# Patient Record
Sex: FEMALE | Race: WHITE | Employment: PART TIME | URBAN - METROPOLITAN AREA
[De-identification: names, ages, dates, MRNs, and addresses within clinical notes are randomized per-mention and may not be internally consistent; named-entity substitution may affect disease eponyms.]

---

## 2018-08-07 ENCOUNTER — HOSPITAL ENCOUNTER (OUTPATIENT)
Dept: GENERAL RADIOLOGY | Age: 48
Discharge: HOME | End: 2018-08-07
Payer: COMMERCIAL

## 2018-08-07 DIAGNOSIS — M25.552 PAIN OF BOTH HIP JOINTS: ICD-10-CM

## 2018-08-07 DIAGNOSIS — M25.551 PAIN OF BOTH HIP JOINTS: ICD-10-CM

## 2018-08-07 DIAGNOSIS — M54.50 ACUTE MIDLINE LOW BACK PAIN WITHOUT SCIATICA: ICD-10-CM

## 2018-08-07 PROCEDURE — 72110 X-RAY EXAM L-2 SPINE 4/>VWS: CPT

## 2018-08-07 PROCEDURE — 73502 X-RAY EXAM HIP UNI 2-3 VIEWS: CPT

## 2018-08-08 PROBLEM — E66.01 SEVERE OBESITY (BMI 35.0-39.9): Status: ACTIVE | Noted: 2018-08-08

## 2018-08-08 PROBLEM — F33.9 RECURRENT DEPRESSION (HCC): Status: ACTIVE | Noted: 2018-08-08

## 2018-08-08 NOTE — PROGRESS NOTES
Lumbar spondylosis, no acute findings. Recommend PT referral if symptoms persist  Imaging reviewed. Will discuss with patient at upcoming appt.

## 2018-08-14 ENCOUNTER — HOSPITAL ENCOUNTER (OUTPATIENT)
Dept: PHYSICAL THERAPY | Age: 48
Discharge: HOME | End: 2018-08-14
Payer: COMMERCIAL

## 2018-08-14 DIAGNOSIS — M54.50 ACUTE MIDLINE LOW BACK PAIN WITHOUT SCIATICA: ICD-10-CM

## 2018-08-14 PROCEDURE — 97140 MANUAL THERAPY 1/> REGIONS: CPT

## 2018-08-14 PROCEDURE — 97161 PT EVAL LOW COMPLEX 20 MIN: CPT

## 2018-08-14 NOTE — PROGRESS NOTES
Ambulatory/Rehab Services H2 Model Falls Risk Assessment    Risk Factor Pts. ·   Confusion/Disorientation/Impulsivity  []    4 ·   Symptomatic Depression  []   2 ·   Altered Elimination  []   1 ·   Dizziness/Vertigo  []   1 ·   Gender (Male)  []   1 ·   Any administered antiepileptics (anticonvulsants):  []   2 ·   Any administered benzodiazepines:  []   1 ·   Visual Impairment (specify):  []   1 ·   Portable Oxygen Use  []   1 ·   Orthostatic ? BP  []   1 ·   History of Recent Falls (within 3 mos.)  []   5     Ability to Rise from Chair (choose one) Pts. ·   Ability to rise in a single movement  [x]   0 ·   Pushes up, successful in one attempt  []   1 ·   Multiple attempts, but successful  []   3 ·   Unable to rise without assistance  []   4   Total: (5 or greater = High Risk) 0     Falls Prevention Plan:   []                Physical Limitations to Exercise (specify):   []                Mobility Assistance Device (type):   []                Exercise/Equipment Adaptation (specify):    ©2010 [facility] [employer] [address] Patent #[id number].  Federal Law prohibits the replication, distribution or use without written permission from [employer]

## 2018-08-14 NOTE — THERAPY EVALUATION
Inge Torres  : 1970  Primary: [facility]*  Secondary:  Therapy Center at [address], [address] [place] [address]  [phone]   [phone]       OUTPATIENT PHYSICAL THERAPY:Initial Assessment 2018   ICD-10: Treatment Diagnosis: low back pain (M54.5); Strain of muscle, fascia and tendon of right hip, sequela (S76.011S); Strain of muscle, fascia and tendon of left hip, sequela (S76.012S); Muscle weakness (generalized) (M62.81)  Precautions/Allergies:   Amphetamine; Benzodiazepines; and Opioids - morphine analogues   Fall Risk Score: 0 (? 5 = High Risk)  MD Orders: evaluate and treat  MEDICAL/REFERRING DIAGNOSIS:  Acute midline low back pain without sciatica [M54.5]   DATE OF ONSET: chronic with worsening over last month   REFERRING PHYSICIAN: Alexi Smith NP  RETURN PHYSICIAN APPOINTMENT: as needed      INITIAL ASSESSMENT:  Ms. Venita Guzman is a 41year old female who presents to PT with report of chronic bilateral hip pain and low back pain. She reports hip pain and back pain that began gradually with no mechanism or injury and has persisted. She reports pain is worst with rising in AM, standing after being seated for a while, sitting > 30 minutes, and limits her ability to exercise and sit cross-legged (albert-cross applesauce). She presents to therapy with decreased soft tissue mobility, painful endrange hip ROM, decreased strength, and decreased endurance. Pt will benefit from skilled PT to address above listed impairments and functional limitations to facilitate return to prior level of function. Due to the chronicity of symptoms, patient's plan of care is 60 days. PROBLEM LIST (Impacting functional limitations):  1. Decreased Strength  2. Decreased ADL/Functional Activities  3. Increased Pain  4. Decreased Activity Tolerance  5.  Decreased Flexibility/Joint Mobility INTERVENTIONS PLANNED:  1. Cold  2. Electrical Stimulation  3. Heat  4. Home Exercise Program (HEP)  5. Manual Therapy  6. Neuromuscular Re-education/Strengthening  7. Range of Motion (ROM)  8. Therapeutic Activites  9. Therapeutic Exercise/Strengthening   TREATMENT PLAN:  Effective Dates: 8/14/2018 TO 10/14/2018 (60 days). Frequency/Duration: 2 times a week for 60 Days  GOALS: (Goals have been discussed and agreed upon with patient.)  Discharge Goals: Time Frame: 10/14/18   1. Pt will report 2/10 or less pain upon waking. 2. Pt will report no limitation in sitting for 60 minutes and minimal to no pain (0-2/10) with rising after sitting for 60 minutes. 3. Pt will report no limitation in sitting cross legged for 20 minutes. 4. Pt will report no limitation in hiking 1 time per week due to pain. 5. Pt will demonstrate functional improvement with GRANT to 20% or less disability. Rehabilitation Potential For Stated Goals: Good            The information in this section was collected on 8/14/18 (except where otherwise noted). HISTORY:   History of Present Injury/Illness (Reason for Referral):  Ms Heller reports both low back pain and bilateral hip pain that are chronic in nature. She reports having low back pain for most of her life that she partially attributes to scoliosis. She reports low back pain has improved significantly in the last week with no specific reason but was intense for the last month, like a muscle spasm. She also reports bilateral lateral hip pain. She reports that this pain is chronic and has been present at least a couple of years. She reports having greater trochanteric bursitis on the right side in April that resolved with an injection and felt different that her chronic pain. She reports that her pain is worst in the AM, especially with initial steps, and improves with movement.   She reports that she has pain with sitting >30 minutes, standing after she has sat for 1 hour or longer, and is unable to sit cross-legged (albert cross applesauce). She is limited in hiking as well. Past Medical History/Comorbidities:   Ms. Milka Young  has a past medical history of Abnormal Pap smear of cervix; Binge eating disorder (7/7/2016); Depression; Drug abuse and dependence ([facility] [address] H/O LEEP; Kidney stones; and Tension headache. Ms. Milka Young  has a past surgical history that includes pr breast surgery procedure unlisted; hx urological; hx orthopaedic; and hx heent. Social History/Living Environment:     Pt lives in a 1 story home alone. Prior Level of Function/Work/Activity:  Pt is currently not working. Current Medications:       Current Outpatient Prescriptions:     acyclovir (ZOVIRAX) 400 mg tablet, Take 1 Tab by mouth three (3) times daily for 10 days. , Disp: 50 Tab, Rfl: 3    meloxicam (MOBIC) 7.5 mg tablet, Take 1 Tab by mouth daily. Indications: OSTEOARTHRITIS, Disp: 30 Tab, Rfl: 2   Date Last Reviewed:  8/15/2018   Number of Personal Factors/Comorbidities that affect the Plan of Care: 1-2: MODERATE COMPLEXITY   EXAMINATION:   Observation/Orthostatic Postural Assessment:          Guarded movement. Palpation:          TTP along bilateral TFL and gluteal tendons at posterior greater trochanter. Minimal tenderness directly at greater trochanter. ROM:          WNL's lumbosacral and hip ROM, but pain reported at endrange IR in lateral hip/buttock   Strength:          Hip flexion: L-4/5, R-4/5       Hip abduction: L- 3/5, R- 3/5       Hip extension: B 2/5       LQS--otherwise 4/5  Special Tests:          Negative slump, SLR, FADIR, BILLY   Functional Mobility:         Gait/Ambulation:  normal  Balance:          SLS: 10 seconds each side---pain reported with SLS on R in gluteal region    Body Structures Involved:  1. Joints  2. Muscles Body Functions Affected:  1. Sensory/Pain  2. Neuromusculoskeletal  3. Movement Related Activities and Participation Affected:  1. Mobility  2.  Domestic Life  3. Interpersonal Interactions and Relationships  4. Community, Social and [place]   Number of elements (examined above) that affect the Plan of Care: 4+: HIGH COMPLEXITY   CLINICAL PRESENTATION:   Presentation: Stable and uncomplicated: LOW COMPLEXITY   CLINICAL DECISION MAKING:   Outcome Measure: Tool Used: Modified Oswestry Low Back Pain Questionnaire  Score:  Initial: 18/50 (36% disability)   Most Recent: X/50 (Date: -- )   Interpretation of Score: Each section is scored on a 0-5 scale, 5 representing the greatest disability. The scores of each section are added together for a total score of 50. Medical Necessity:   · Patient is expected to demonstrate progress in strength and range of motion to increase independence with painfree movements. Reason for Services/Other Comments:  · Patient continues to require present interventions due to patient's inability to sit for required amounts of time. Use of outcome tool(s) and clinical judgement create a POC that gives a: Clear prediction of patient's progress: LOW COMPLEXITY            TREATMENT:   (In addition to Assessment/Re-Assessment sessions the following treatments were rendered)  Pre-treatment Symptoms/Complaints:  Pt reports that she is having a lot of pain and that the pain ranges from 8/10 to 2/10. Pain: Initial:   Pain Intensity 1: 2  Post Session:  2/10      Manual Therapy (    Soft Tissue Mobilization Duration  Duration: 15 Minutes): Manual techniques to facilitate improved motion and decreased pain.  (Used abbreviations: MET - muscle energy technique; PNF - proprioceptive neuromuscular facilitation; NMR - neuromuscular re-education; a/p - anterior to posterior; p/a - posterior to anterior)   · STM to right tensor fascia yamila and gluteal muscles    Therapeutic Exercise: ( ):  Exercises per grid below to improve mobility, strength and dynamic movement of back - bilateral and hip - bilateral to improve functional bending and walking, standing. Required moderate verbal cues to promote proper body mechanics. Progressed resistance, range and repetitions as indicated. 8/15/2018   Activity/Exercise Parameters                 Patient education Plan of care, expectations, chronic pain intro, HEP with YAMINI HENSLEY                                                                                 [facility] Portal  Treatment/Session Assessment:    · Response to Treatment:  Pt report no change in pain with manual treatment and HEP exercises. · Compliance with Program/Exercises: compliant most of the time. · Recommendations/Intent for next treatment session: \"Next visit will focus on advancements to more challenging activities\".   Total Treatment Duration:  PT Patient Time In/Time Out  Time In: 1400  Time Out: 1500    Ernestina Cables, PT

## 2018-08-22 ENCOUNTER — HOSPITAL ENCOUNTER (OUTPATIENT)
Dept: PHYSICAL THERAPY | Age: 48
Discharge: HOME | End: 2018-08-22
Payer: COMMERCIAL

## 2018-08-22 PROCEDURE — 97110 THERAPEUTIC EXERCISES: CPT

## 2018-08-22 PROCEDURE — 97140 MANUAL THERAPY 1/> REGIONS: CPT

## 2018-08-22 NOTE — PROGRESS NOTES
Stephanie Marcum  : 1970  Primary: [facility]*  Secondary:  Therapy Center at [address], [address] [place] [address]  [phone]   [phone]       OUTPATIENT PHYSICAL THERAPY:Daily Note 2018   ICD-10: Treatment Diagnosis: low back pain (M54.5); Strain of muscle, fascia and tendon of right hip, sequela (S76.011S); Strain of muscle, fascia and tendon of left hip, sequela (S76.012S); Muscle weakness (generalized) (M62.81)  Precautions/Allergies:   Amphetamine; Benzodiazepines; and Opioids - morphine analogues   Fall Risk Score: 0 (? 5 = High Risk)  MD Orders: evaluate and treat  MEDICAL/REFERRING DIAGNOSIS:  Low back pain [M54.5]   DATE OF ONSET: chronic with worsening over last month   REFERRING PHYSICIAN: Kinsey Echeverria NP  RETURN PHYSICIAN APPOINTMENT: as needed      INITIAL ASSESSMENT:  Ms. Dee Trimble is a 41year old female who presents to PT with report of chronic bilateral hip pain and low back pain. She reports hip pain and back pain that began gradually with no mechanism or injury and has persisted. She reports pain is worst with rising in AM, standing after being seated for a while, sitting > 30 minutes, and limits her ability to exercise and sit cross-legged (albert-cross applesauce). She presents to therapy with decreased soft tissue mobility, painful endrange hip ROM, decreased strength, and decreased endurance. Pt will benefit from skilled PT to address above listed impairments and functional limitations to facilitate return to prior level of function. Due to the chronicity of symptoms, patient's plan of care is 60 days. PROBLEM LIST (Impacting functional limitations):  1. Decreased Strength  2. Decreased ADL/Functional Activities  3. Increased Pain  4. Decreased Activity Tolerance  5. Decreased Flexibility/Joint Mobility INTERVENTIONS PLANNED:  1. Cold  2. Electrical Stimulation  3. Heat  4.  Home Exercise Program (HEP)  5. Manual Therapy  6. Neuromuscular Re-education/Strengthening  7. Range of Motion (ROM)  8. Therapeutic Activites  9. Therapeutic Exercise/Strengthening   TREATMENT PLAN:  Effective Dates: 8/14/2018 TO 10/14/2018 (60 days). Frequency/Duration: 2 times a week for 60 Days  GOALS: (Goals have been discussed and agreed upon with patient.)  Discharge Goals: Time Frame: 10/14/18   1. Pt will report 2/10 or less pain upon waking. ONGOING  2. Pt will report no limitation in sitting for 60 minutes and minimal to no pain (0-2/10) with rising after sitting for 60 minutes. ONGOING  3. Pt will report no limitation in sitting cross legged for 20 minutes. ONGOING  4. Pt will report no limitation in hiking 1 time per week due to pain. ONGOING  5. Pt will demonstrate functional improvement with GRANT to 20% or less disability. ONGOING  Rehabilitation Potential For Stated Goals: Good            The information in this section was collected on 8/14/18 (except where otherwise noted). HISTORY:   History of Present Injury/Illness (Reason for Referral):  Ms Heller reports both low back pain and bilateral hip pain that are chronic in nature. She reports having low back pain for most of her life that she partially attributes to scoliosis. She reports low back pain has improved significantly in the last week with no specific reason but was intense for the last month, like a muscle spasm. She also reports bilateral lateral hip pain. She reports that this pain is chronic and has been present at least a couple of years. She reports having greater trochanteric bursitis on the right side in April that resolved with an injection and felt different that her chronic pain. She reports that her pain is worst in the AM, especially with initial steps, and improves with movement.   She reports that she has pain with sitting >30 minutes, standing after she has sat for 1 hour or longer, and is unable to sit cross-legged (albert cross applesauce). She is limited in hiking as well. Past Medical History/Comorbidities:   Ms. Yuli Tellez  has a past medical history of Abnormal Pap smear of cervix; Binge eating disorder (7/7/2016); Depression; Drug abuse and dependence ([facility] [address] H/O LEEP; Kidney stones; and Tension headache. Ms. Yuli Tellez  has a past surgical history that includes pr breast surgery procedure unlisted; hx urological; hx orthopaedic; and hx heent. Social History/Living Environment:     Pt lives in a 1 story home alone. Prior Level of Function/Work/Activity:  Pt is currently not working. Current Medications:       Current Outpatient Prescriptions:     meloxicam (MOBIC) 7.5 mg tablet, Take 1 Tab by mouth daily. Indications: OSTEOARTHRITIS, Disp: 30 Tab, Rfl: 2   Date Last Reviewed:  8/22/2018   Number of Personal Factors/Comorbidities that affect the Plan of Care: 1-2: MODERATE COMPLEXITY   EXAMINATION:   Observation/Orthostatic Postural Assessment:          Guarded movement. Palpation:          TTP along bilateral TFL and gluteal tendons at posterior greater trochanter. Minimal tenderness directly at greater trochanter. ROM:          WNL's lumbosacral and hip ROM, but pain reported at endrange IR in lateral hip/buttock   Strength:          Hip flexion: L-4/5, R-4/5       Hip abduction: L- 3/5, R- 3/5       Hip extension: B 2/5       LQS--otherwise 4/5  Special Tests:          Negative slump, SLR, FADIR, BILLY   Functional Mobility:         Gait/Ambulation:  normal  Balance:          SLS: 10 seconds each side---pain reported with SLS on R in gluteal region    Body Structures Involved:  1. Joints  2. Muscles Body Functions Affected:  1. Sensory/Pain  2. Neuromusculoskeletal  3. Movement Related Activities and Participation Affected:  1. Mobility  2. Domestic Life  3. Interpersonal Interactions and Relationships  4.  Community, Social and [place]   Number of elements (examined above) that affect the Plan of Care: 4+: HIGH COMPLEXITY   CLINICAL PRESENTATION:   Presentation: Stable and uncomplicated: LOW COMPLEXITY   CLINICAL DECISION MAKING:   Outcome Measure: Tool Used: Modified Oswestry Low Back Pain Questionnaire  Score:  Initial: 18/50 (36% disability)   Most Recent: X/50 (Date: -- )   Interpretation of Score: Each section is scored on a 0-5 scale, 5 representing the greatest disability. The scores of each section are added together for a total score of 50. Medical Necessity:   · Patient is expected to demonstrate progress in strength and range of motion to increase independence with painfree movements. Reason for Services/Other Comments:  · Patient continues to require present interventions due to patient's inability to sit for required amounts of time. Use of outcome tool(s) and clinical judgement create a POC that gives a: Clear prediction of patient's progress: LOW COMPLEXITY            TREATMENT:   (In addition to Assessment/Re-Assessment sessions the following treatments were rendered)  Pre-treatment Symptoms/Complaints:  Pt reports that she has had a kidney stone bothering her since her last appointment so she has not been able to attend therapy since then. She reports that she has been doing her HEP and walking a mile a day and that those seem to help a little. Pain: Initial:   Pain Intensity 1: 2  Post Session:  2/10      Manual Therapy (    Soft Tissue Mobilization Duration  Duration: 20 Minutes): Manual techniques to facilitate improved motion and decreased pain.  (Used abbreviations: MET - muscle energy technique; PNF - proprioceptive neuromuscular facilitation; NMR - neuromuscular re-education; a/p - anterior to posterior; p/a - posterior to anterior)   · STM to right tensor fascia yamila and gluteal muscles  · Long axis traction  · Manual KTC and gluteal stretches     Therapeutic Exercise: (50 Minutes):  Exercises per grid below to improve mobility, strength and dynamic movement of back - bilateral and hip - bilateral to improve functional bending and walking, standing. Required moderate verbal cues to promote proper body mechanics. Progressed resistance, range and repetitions as indicated. 8/22/2018   Activity/Exercise Parameters                 Patient education TNE---emotions and pain x 10 minutes flashcards    Nustep  5 minutes    ROM KTYAMINI VARGAS rocking   Figure 4 stretch  3 x 30 sec each side    Bridges  2 x 10    Clamshells  2 x 10    SLR flexion  2 x 10        [employer] Portal  Treatment/Session Assessment:    · Response to Treatment:  Pt reports that the manual techniques feel good and seem to help. Pt with good tolerance with other exercises as well. Good comprehension and acceptance of TNE discussion as well today. · Compliance with Program/Exercises: compliant most of the time. · Recommendations/Intent for next treatment session: \"Next visit will focus on advancements to more challenging activities\".   Total Treatment Duration:  PT Patient Time In/Time Out  Time In: 1400  Time Out: YUMI Romano

## 2018-08-23 ENCOUNTER — APPOINTMENT (OUTPATIENT)
Dept: PHYSICAL THERAPY | Age: 48
End: 2018-08-23
Payer: COMMERCIAL

## 2018-08-28 ENCOUNTER — HOSPITAL ENCOUNTER (OUTPATIENT)
Dept: PHYSICAL THERAPY | Age: 48
Discharge: HOME | End: 2018-08-28
Payer: COMMERCIAL

## 2018-08-28 PROCEDURE — 97140 MANUAL THERAPY 1/> REGIONS: CPT

## 2018-08-28 PROCEDURE — 97110 THERAPEUTIC EXERCISES: CPT

## 2018-08-28 NOTE — PROGRESS NOTES
Jacky Bradshaw : 1970 Primary: [facility] Secondary:  Therapy Center at [address], [address] [place] [address] [phone]   [phone] OUTPATIENT PHYSICAL THERAPY:Daily Note 2018 ICD-10: Treatment Diagnosis: low back pain (M54.5); Strain of muscle, fascia and tendon of right hip, sequela (S76.011S); Strain of muscle, fascia and tendon of left hip, sequela (S76.012S); Muscle weakness (generalized) (M62.81) Precautions/Allergies:  
Amphetamine; Benzodiazepines; and Opioids - morphine analogues Fall Risk Score: 0 (? 5 = High Risk) MD Orders: evaluate and treat  MEDICAL/REFERRING DIAGNOSIS: 
Low back pain [M54.5] DATE OF ONSET: chronic with worsening over last month REFERRING PHYSICIAN: Shirlene Harris NP 
RETURN PHYSICIAN APPOINTMENT: as needed INITIAL ASSESSMENT:  Ms. Denis Castaneda is a 41year old female who presents to PT with report of chronic bilateral hip pain and low back pain. She reports hip pain and back pain that began gradually with no mechanism or injury and has persisted. She reports pain is worst with rising in AM, standing after being seated for a while, sitting > 30 minutes, and limits her ability to exercise and sit cross-legged (albert-cross applesauce). She presents to therapy with decreased soft tissue mobility, painful endrange hip ROM, decreased strength, and decreased endurance. Pt will benefit from skilled PT to address above listed impairments and functional limitations to facilitate return to prior level of function. Due to the chronicity of symptoms, patient's plan of care is 60 days. PROBLEM LIST (Impacting functional limitations): 1. Decreased Strength 2. Decreased ADL/Functional Activities 3. Increased Pain 4. Decreased Activity Tolerance 5. Decreased Flexibility/Joint Mobility INTERVENTIONS PLANNED: 
1. Cold 2. Electrical Stimulation 3. Heat 4. Home Exercise Program (HEP) 5. Manual Therapy 6. Neuromuscular Re-education/Strengthening 7. Range of Motion (ROM) 8. Therapeutic Activites 9. Therapeutic Exercise/Strengthening TREATMENT PLAN: 
Effective Dates: 8/14/2018 TO 10/14/2018 (60 days). Frequency/Duration: 2 times a week for 60 Days GOALS: (Goals have been discussed and agreed upon with patient.) Discharge Goals: Time Frame: 10/14/18 1. Pt will report 2/10 or less pain upon waking. ONGOING 2. Pt will report no limitation in sitting for 60 minutes and minimal to no pain (0-2/10) with rising after sitting for 60 minutes. ONGOING 3. Pt will report no limitation in sitting cross legged for 20 minutes. ONGOING 4. Pt will report no limitation in hiking 1 time per week due to pain. ONGOING 5. Pt will demonstrate functional improvement with GRANT to 20% or less disability. ONGOING Rehabilitation Potential For Stated Goals: Good The information in this section was collected on 8/14/18 (except where otherwise noted). HISTORY:  
History of Present Injury/Illness (Reason for Referral): Ms Heller reports both low back pain and bilateral hip pain that are chronic in nature. She reports having low back pain for most of her life that she partially attributes to scoliosis. She reports low back pain has improved significantly in the last week with no specific reason but was intense for the last month, like a muscle spasm. She also reports bilateral lateral hip pain. She reports that this pain is chronic and has been present at least a couple of years. She reports having greater trochanteric bursitis on the right side in April that resolved with an injection and felt different that her chronic pain. She reports that her pain is worst in the AM, especially with initial steps, and improves with movement.   She reports that she has pain with sitting >30 minutes, standing after she has sat for 1 hour or longer, and is unable to sit cross-legged (albert cross applesauce). She is limited in hiking as well. Past Medical History/Comorbidities: Ms. Ryan Lanza  has a past medical history of Abnormal Pap smear of cervix; Binge eating disorder (7/7/2016); Depression; Drug abuse and dependence ([facility] [address] H/O LEEP; Kidney stones; and Tension headache. Ms. Ryan Lanza  has a past surgical history that includes pr breast surgery procedure unlisted; hx urological; hx orthopaedic; and hx heent. Social History/Living Environment:  
  Pt lives in a 1 story home alone. Prior Level of Function/Work/Activity: 
Pt is currently not working. Current Medications:   
  
Current Outpatient Prescriptions:  
  meloxicam (MOBIC) 7.5 mg tablet, Take 1 Tab by mouth daily. Indications: OSTEOARTHRITIS, Disp: 30 Tab, Rfl: 2 Date Last Reviewed:  8/28/2018 Number of Personal Factors/Comorbidities that affect the Plan of Care: 1-2: MODERATE COMPLEXITY EXAMINATION:  
Observation/Orthostatic Postural Assessment:   
      Guarded movement. Palpation:   
      TTP along bilateral TFL and gluteal tendons at posterior greater trochanter. Minimal tenderness directly at greater trochanter. ROM:   
      WNL's lumbosacral and hip ROM, but pain reported at endrange IR in lateral hip/buttock Strength:   
      Hip flexion: L-4/5, R-4/5 Hip abduction: L- 3/5, R- 3/5 Hip extension: B 2/5 LQS--otherwise 4/5 Special Tests:   
      Negative slump, SLR, FADIR, BILLY Functional Mobility:  
      Gait/Ambulation:  normal 
Balance: SLS: 10 seconds each side---pain reported with SLS on R in gluteal region Body Structures Involved: 1. Joints 2. Muscles Body Functions Affected: 1. Sensory/Pain 2. Neuromusculoskeletal 
3. Movement Related Activities and Participation Affected: 1. Mobility 2. Domestic Life 3. Interpersonal Interactions and Relationships 4. Community, Social and [place] Number of elements (examined above) that affect the Plan of Care: 4+: HIGH COMPLEXITY CLINICAL PRESENTATION:  
Presentation: Stable and uncomplicated: LOW COMPLEXITY CLINICAL DECISION MAKING:  
Outcome Measure: Tool Used: Modified Oswestry Low Back Pain Questionnaire Score:  Initial: 18/50 (36% disability)   Most Recent: X/50 (Date: -- ) Interpretation of Score: Each section is scored on a 0-5 scale, 5 representing the greatest disability. The scores of each section are added together for a total score of 50. Medical Necessity:  
· Patient is expected to demonstrate progress in strength and range of motion to increase independence with painfree movements. Reason for Services/Other Comments: 
· Patient continues to require present interventions due to patient's inability to sit for required amounts of time. Use of outcome tool(s) and clinical judgement create a POC that gives a: Clear prediction of patient's progress: LOW COMPLEXITY  
  
 
 
 
TREATMENT:  
(In addition to Assessment/Re-Assessment sessions the following treatments were rendered) Pre-treatment Symptoms/Complaints:  Pt reports that she continues to have soreness in her hips but that she was able to sit in an uncomfortable chair for a long time a few days ago and had no pain, which she would have had before. Pain: Initial:  
Pain Intensity 1: 2  Post Session:  2/10 Manual Therapy (    Soft Tissue Mobilization Duration Duration: 20 Minutes): Manual techniques to facilitate improved motion and decreased pain. (Used abbreviations: MET - muscle energy technique; PNF - proprioceptive neuromuscular facilitation; NMR - neuromuscular re-education; a/p - anterior to posterior; p/a - posterior to anterior) · STM to right tensor fascia yamila and gluteal muscles · Long axis traction · Manual KTC and gluteal stretches Therapeutic Exercise: (40 Minutes):  Exercises per grid below to improve mobility, strength and dynamic movement of back - bilateral and hip - bilateral to improve functional bending and walking, standing. Required moderate verbal cues to promote proper body mechanics. Progressed resistance, range and repetitions as indicated. 8/28/2018 Activity/Exercise Parameters Patient education --  
Nustep  5 minutes ROM KTYAMINI VARGAS rocking Figure 4 stretch  3 x 30 sec each side; pigeon pose Bridges  --  
Clamshells  --   
SLR flexion  --   
Band walks 2 x 20 feet each with red band Hip abduction standing  On foam 2 x 10 each side [facility] 
Treatment/Session Assessment:   
· Response to Treatment:  Pt good tolerance with progression of CKC exercises today. · Compliance with Program/Exercises: compliant most of the time. · Recommendations/Intent for next treatment session: \"Next visit will focus on advancements to more challenging activities\". Total Treatment Duration: PT Patient Time In/Time Out Time In: 1000 Time Out: 1055 Brandt Dunn PT

## 2018-08-31 ENCOUNTER — HOSPITAL ENCOUNTER (OUTPATIENT)
Dept: PHYSICAL THERAPY | Age: 48
Discharge: HOME | End: 2018-08-31
Payer: COMMERCIAL

## 2018-08-31 NOTE — PROGRESS NOTES
Edwin Tong  
(:1970) Therapy Center at 
[facility], [address] [facility] [phone]  [phone] DATE: 2018 Patient canceled for appointment today due to not feeling well. Will plan to follow up on next scheduled visit.  
 
 
Jennifer Shannon, PT, DPT, OCS

## 2018-09-04 ENCOUNTER — APPOINTMENT (OUTPATIENT)
Dept: PHYSICAL THERAPY | Age: 48
End: 2018-09-04

## 2018-10-11 PROBLEM — R76.8 HEPATITIS C ANTIBODY POSITIVE IN BLOOD: Status: ACTIVE | Noted: 2018-10-11

## 2018-11-12 ENCOUNTER — HOSPITAL ENCOUNTER (OUTPATIENT)
Dept: GENERAL RADIOLOGY | Age: 48
Discharge: HOME | End: 2018-11-12
Payer: COMMERCIAL

## 2018-11-12 DIAGNOSIS — N20.0 KIDNEY STONE: ICD-10-CM

## 2018-11-12 PROCEDURE — 74018 RADEX ABDOMEN 1 VIEW: CPT

## 2018-11-12 NOTE — THERAPY DISCHARGE
Tanner Quiroga : 1970 Primary: [facility]* Secondary:  Therapy Center at [address], [address] [place] [address] [phone]   [phone] OUTPATIENT PHYSICAL THERAPY:Discontinuation Summary 2018 ICD-10: Treatment Diagnosis: low back pain (M54.5); Strain of muscle, fascia and tendon of right hip, sequela (S76.011S); Strain of muscle, fascia and tendon of left hip, sequela (S76.012S); Muscle weakness (generalized) (M62.81) Precautions/Allergies:  
Amphetamine; Benzodiazepines; and Opioids - morphine analogues Fall Risk Score: 0 (? 5 = High Risk) MD Orders: evaluate and treat  MEDICAL/REFERRING DIAGNOSIS: 
Low back pain [M54.5] DATE OF ONSET: chronic with worsening over last month REFERRING PHYSICIAN: Freddy Campbell NP 
RETURN PHYSICIAN APPOINTMENT: as needed INITIAL ASSESSMENT:  Ms. Natty Reese is a 41year old female who presents to PT with report of chronic bilateral hip pain and low back pain. She reports hip pain and back pain that began gradually with no mechanism or injury and has persisted. She reports pain is worst with rising in AM, standing after being seated for a while, sitting > 30 minutes, and limits her ability to exercise and sit cross-legged (albert-cross applesauce). She presents to therapy with decreased soft tissue mobility, painful endrange hip ROM, decreased strength, and decreased endurance. Pt will benefit from skilled PT to address above listed impairments and functional limitations to facilitate return to prior level of function. Due to the chronicity of symptoms, patient's plan of care is 60 days. 18: Pt attended evaluation and 2 follow up visits. She canceled and no showed each to a visit following initial visits and then was not able to be reached via phone. As a result, she is discharged from therapy without achieving therapy goals. PROBLEM LIST (Impacting functional limitations): 1. Decreased Strength 2. Decreased ADL/Functional Activities 3. Increased Pain 4. Decreased Activity Tolerance 5. Decreased Flexibility/Joint Mobility INTERVENTIONS PLANNED: 
1. Cold 2. Electrical Stimulation 3. Heat 4. Home Exercise Program (HEP) 5. Manual Therapy 6. Neuromuscular Re-education/Strengthening 7. Range of Motion (ROM) 8. Therapeutic Activites 9. Therapeutic Exercise/Strengthening TREATMENT PLAN: 
Effective Dates: 8/14/2018 TO 10/14/2018 (60 days). Frequency/Duration: 2 times a week for 60 Days GOALS: (Goals have been discussed and agreed upon with patient.) Discharge Goals: Time Frame: 10/14/18 1. Pt will report 2/10 or less pain upon waking. Did not achieve 2. Pt will report no limitation in sitting for 60 minutes and minimal to no pain (0-2/10) with rising after sitting for 60 minutes. Did not achieve 3. Pt will report no limitation in sitting cross legged for 20 minutes. Did not achieve 4. Pt will report no limitation in hiking 1 time per week due to pain. Did not achieve 5. Pt will demonstrate functional improvement with GRANT to 20% or less disability. Did not achieve Rehabilitation Potential For Stated Goals: Good The information in this section was collected on 8/14/18 (except where otherwise noted). HISTORY:  
History of Present Injury/Illness (Reason for Referral): Ms Heller reports both low back pain and bilateral hip pain that are chronic in nature. She reports having low back pain for most of her life that she partially attributes to scoliosis. She reports low back pain has improved significantly in the last week with no specific reason but was intense for the last month, like a muscle spasm. She also reports bilateral lateral hip pain. She reports that this pain is chronic and has been present at least a couple of years.   She reports having greater trochanteric bursitis on the right side in April that resolved with an injection and felt different that her chronic pain. She reports that her pain is worst in the AM, especially with initial steps, and improves with movement. She reports that she has pain with sitting >30 minutes, standing after she has sat for 1 hour or longer, and is unable to sit cross-legged (albert cross applesauce). She is limited in hiking as well. Past Medical History/Comorbidities: Ms. Yariel Araiza  has a past medical history of Abnormal Pap smear of cervix; Binge eating disorder (7/7/2016); Depression; Drug abuse and dependence ([facility] [address] H/O LEEP; Kidney stones; and Tension headache. Ms. Yariel Araiza  has a past surgical history that includes pr breast surgery procedure unlisted; hx urological; hx orthopaedic; hx heent; and DILATATION AND CURETTAGE HYSTEROSCOPY (N/A, 2/28/2011). Social History/Living Environment:  
  Pt lives in a 1 story home alone. Prior Level of Function/Work/Activity: 
Pt is currently not working. Current Medications:   
  
Current Outpatient Medications:  
  butalbital-acetaminophen-caffeine (FIORICET, ESGIC) -40 mg per tablet, Take 1 Tab by mouth every six (6) hours as needed for Pain., Disp: 20 Tab, Rfl: 2 
  meloxicam (MOBIC) 7.5 mg tablet, Take 1 Tab by mouth daily. Indications: OSTEOARTHRITIS, Disp: 30 Tab, Rfl: 2 Date Last Reviewed:  11/12/2018 Number of Personal Factors/Comorbidities that affect the Plan of Care: 1-2: MODERATE COMPLEXITY EXAMINATION:  
Observation/Orthostatic Postural Assessment:   
      Guarded movement. Palpation:   
      TTP along bilateral TFL and gluteal tendons at posterior greater trochanter. Minimal tenderness directly at greater trochanter. ROM:   
      WNL's lumbosacral and hip ROM, but pain reported at endrange IR in lateral hip/buttock Strength:   
      Hip flexion: L-4/5, R-4/5 Hip abduction: L- 3/5, R- 3/5      Hip extension: B 2/5 
 LQS--otherwise 4/5 Special Tests:   
      Negative slump, SLR, FADIR, BILLY Functional Mobility:  
      Gait/Ambulation:  normal 
Balance: SLS: 10 seconds each side---pain reported with SLS on R in gluteal region Body Structures Involved: 1. Joints 2. Muscles Body Functions Affected: 1. Sensory/Pain 2. Neuromusculoskeletal 
3. Movement Related Activities and Participation Affected: 1. Mobility 2. Domestic Life 3. Interpersonal Interactions and Relationships 4. Community, Social and [employer] Number of elements (examined above) that affect the Plan of Care: 4+: HIGH COMPLEXITY CLINICAL PRESENTATION:  
Presentation: Stable and uncomplicated: LOW COMPLEXITY CLINICAL DECISION MAKING:  
Outcome Measure: Tool Used: Modified Oswestry Low Back Pain Questionnaire Score:  Initial: 18/50 (36% disability)   Most Recent: X/50 (Date: -- ) Interpretation of Score: Each section is scored on a 0-5 scale, 5 representing the greatest disability. The scores of each section are added together for a total score of 50. Medical Necessity:  
· Patient is expected to demonstrate progress in strength and range of motion to increase independence with painfree movements. Reason for Services/Other Comments: 
· Patient continues to require present interventions due to patient's inability to sit for required amounts of time. Use of outcome tool(s) and clinical judgement create a POC that gives a: Clear prediction of patient's progress: LOW COMPLEXITY Discharge.   
Clare Duvall, PT